# Patient Record
Sex: MALE | Race: WHITE | NOT HISPANIC OR LATINO | URBAN - METROPOLITAN AREA
[De-identification: names, ages, dates, MRNs, and addresses within clinical notes are randomized per-mention and may not be internally consistent; named-entity substitution may affect disease eponyms.]

---

## 2017-02-13 ENCOUNTER — OFFICE VISIT - HEALTHEAST (OUTPATIENT)
Dept: FAMILY MEDICINE | Facility: CLINIC | Age: 37
End: 2017-02-13

## 2017-02-13 DIAGNOSIS — J02.0 ACUTE STREPTOCOCCAL PHARYNGITIS: ICD-10-CM

## 2017-02-13 DIAGNOSIS — R07.0 THROAT PAIN: ICD-10-CM

## 2017-02-13 DIAGNOSIS — L08.9 SKIN INFECTION: ICD-10-CM

## 2017-05-09 ENCOUNTER — OFFICE VISIT - HEALTHEAST (OUTPATIENT)
Dept: FAMILY MEDICINE | Facility: CLINIC | Age: 37
End: 2017-05-09

## 2017-05-09 ENCOUNTER — RECORDS - HEALTHEAST (OUTPATIENT)
Dept: GENERAL RADIOLOGY | Facility: CLINIC | Age: 37
End: 2017-05-09

## 2017-05-09 DIAGNOSIS — M25.552 PAIN IN LEFT HIP: ICD-10-CM

## 2017-05-09 DIAGNOSIS — M25.552 LEFT HIP PAIN: ICD-10-CM

## 2017-05-09 ASSESSMENT — MIFFLIN-ST. JEOR: SCORE: 1803.06

## 2017-05-24 ENCOUNTER — COMMUNICATION - HEALTHEAST (OUTPATIENT)
Dept: FAMILY MEDICINE | Facility: CLINIC | Age: 37
End: 2017-05-24

## 2018-06-21 ENCOUNTER — OFFICE VISIT - HEALTHEAST (OUTPATIENT)
Dept: FAMILY MEDICINE | Facility: CLINIC | Age: 38
End: 2018-06-21

## 2018-06-21 ENCOUNTER — RECORDS - HEALTHEAST (OUTPATIENT)
Dept: GENERAL RADIOLOGY | Facility: CLINIC | Age: 38
End: 2018-06-21

## 2018-06-21 DIAGNOSIS — G89.29 OTHER CHRONIC PAIN: ICD-10-CM

## 2018-06-21 DIAGNOSIS — M25.562 CHRONIC PAIN OF LEFT KNEE: ICD-10-CM

## 2018-06-21 DIAGNOSIS — M25.562 PAIN IN LEFT KNEE: ICD-10-CM

## 2018-06-21 DIAGNOSIS — G89.29 CHRONIC PAIN OF LEFT KNEE: ICD-10-CM

## 2018-06-21 ASSESSMENT — MIFFLIN-ST. JEOR: SCORE: 1808.5

## 2018-06-22 ENCOUNTER — COMMUNICATION - HEALTHEAST (OUTPATIENT)
Dept: FAMILY MEDICINE | Facility: CLINIC | Age: 38
End: 2018-06-22

## 2018-06-22 DIAGNOSIS — M25.562 CHRONIC PAIN OF LEFT KNEE: ICD-10-CM

## 2018-06-22 DIAGNOSIS — G89.29 CHRONIC PAIN OF LEFT KNEE: ICD-10-CM

## 2018-07-18 ENCOUNTER — OFFICE VISIT - HEALTHEAST (OUTPATIENT)
Dept: PHYSICAL THERAPY | Facility: REHABILITATION | Age: 38
End: 2018-07-18

## 2018-07-18 DIAGNOSIS — G89.29 CHRONIC PAIN OF LEFT KNEE: ICD-10-CM

## 2018-07-18 DIAGNOSIS — M25.562 CHRONIC PAIN OF LEFT KNEE: ICD-10-CM

## 2018-07-30 ENCOUNTER — OFFICE VISIT - HEALTHEAST (OUTPATIENT)
Dept: PHYSICAL THERAPY | Facility: REHABILITATION | Age: 38
End: 2018-07-30

## 2018-07-30 DIAGNOSIS — M25.562 CHRONIC PAIN OF LEFT KNEE: ICD-10-CM

## 2018-07-30 DIAGNOSIS — G89.29 CHRONIC PAIN OF LEFT KNEE: ICD-10-CM

## 2018-08-03 ENCOUNTER — OFFICE VISIT - HEALTHEAST (OUTPATIENT)
Dept: PHYSICAL THERAPY | Facility: REHABILITATION | Age: 38
End: 2018-08-03

## 2018-08-03 DIAGNOSIS — G89.29 CHRONIC PAIN OF LEFT KNEE: ICD-10-CM

## 2018-08-03 DIAGNOSIS — M25.562 CHRONIC PAIN OF LEFT KNEE: ICD-10-CM

## 2018-08-22 ENCOUNTER — COMMUNICATION - HEALTHEAST (OUTPATIENT)
Dept: PHYSICAL THERAPY | Facility: REHABILITATION | Age: 38
End: 2018-08-22

## 2019-09-20 ENCOUNTER — OFFICE VISIT - HEALTHEAST (OUTPATIENT)
Dept: FAMILY MEDICINE | Facility: CLINIC | Age: 39
End: 2019-09-20

## 2019-09-20 ENCOUNTER — COMMUNICATION - HEALTHEAST (OUTPATIENT)
Dept: TELEHEALTH | Facility: CLINIC | Age: 39
End: 2019-09-20

## 2019-09-20 DIAGNOSIS — M25.561 CHRONIC PAIN OF RIGHT KNEE: ICD-10-CM

## 2019-09-20 DIAGNOSIS — G89.29 CHRONIC PAIN OF RIGHT KNEE: ICD-10-CM

## 2019-09-20 ASSESSMENT — MIFFLIN-ST. JEOR: SCORE: 1783.55

## 2019-09-30 ENCOUNTER — HOSPITAL ENCOUNTER (OUTPATIENT)
Dept: RADIOLOGY | Facility: HOSPITAL | Age: 39
Discharge: HOME | End: 2019-09-30
Attending: NURSE PRACTITIONER

## 2019-09-30 ENCOUNTER — HOSPITAL ENCOUNTER (OUTPATIENT)
Dept: MRI IMAGING | Facility: HOSPITAL | Age: 39
Discharge: HOME | End: 2019-09-30
Attending: NURSE PRACTITIONER

## 2019-09-30 DIAGNOSIS — M25.561 CHRONIC PAIN OF RIGHT KNEE: ICD-10-CM

## 2019-09-30 DIAGNOSIS — G89.29 CHRONIC PAIN OF RIGHT KNEE: ICD-10-CM

## 2019-10-01 ENCOUNTER — COMMUNICATION - HEALTHEAST (OUTPATIENT)
Dept: FAMILY MEDICINE | Facility: CLINIC | Age: 39
End: 2019-10-01

## 2021-05-25 ENCOUNTER — RECORDS - HEALTHEAST (OUTPATIENT)
Dept: ADMINISTRATIVE | Facility: CLINIC | Age: 41
End: 2021-05-25

## 2021-05-28 ENCOUNTER — RECORDS - HEALTHEAST (OUTPATIENT)
Dept: ADMINISTRATIVE | Facility: CLINIC | Age: 41
End: 2021-05-28

## 2021-05-30 VITALS — WEIGHT: 197.7 LBS

## 2021-05-31 VITALS — HEIGHT: 71 IN | BODY MASS INDEX: 26.57 KG/M2 | WEIGHT: 189.8 LBS

## 2021-06-01 VITALS — HEIGHT: 71 IN | BODY MASS INDEX: 26.74 KG/M2 | WEIGHT: 191 LBS

## 2021-06-01 NOTE — PROGRESS NOTES
Assessment and Plan:     1. Chronic pain of right knee  Differentials include meniscus injury, tendinitis, ligament injury, IT band syndrome, patellofemoral syndrome.  Due to positive Mike's, will obtain MRI to rule out meniscus injury.  Discussed symptomatic treatment including rest, ice, elevation, and NSAID's.  Will refer to ortho for further evaluation.  He is content with the plan.   - Ambulatory referral to Orthopedics  - MR Knee Without Contrast Right; Future    Subjective:     Christiano is a 38 y.o. male presenting to the clinic for concerns for ongoing right knee pain.  Patient has been experiencing right knee pain intermittently for multiple years.  He denies any known injury, but states the pain has worsened over the past month.  He has had difficulty walking up and down stairs. The pain is waking him up at night.  He feels as though the knee is going to give out from under him.  He descries the pain as an ache within the lateral aspect of the knee.  He has not noticed any redness, swelling, bruising.  He has been icing and resting area.  He has a history of playing hockey, football, baseball, soccer when he was younger.  He currently works in construction.  He has been taking ibuprofen.    Review of Systems: A complete 14 point review of systems was obtained and is negative or as stated in the history of present illness.    Social History     Socioeconomic History     Marital status:      Spouse name: Not on file     Number of children: Not on file     Years of education: Not on file     Highest education level: Not on file   Occupational History     Not on file   Social Needs     Financial resource strain: Not on file     Food insecurity:     Worry: Not on file     Inability: Not on file     Transportation needs:     Medical: Not on file     Non-medical: Not on file   Tobacco Use     Smoking status: Never Smoker     Smokeless tobacco: Never Used   Substance and Sexual Activity     Alcohol use:  "Yes     Comment: rare     Drug use: No     Sexual activity: Yes     Partners: Female   Lifestyle     Physical activity:     Days per week: Not on file     Minutes per session: Not on file     Stress: Not on file   Relationships     Social connections:     Talks on phone: Not on file     Gets together: Not on file     Attends Shinto service: Not on file     Active member of club or organization: Not on file     Attends meetings of clubs or organizations: Not on file     Relationship status: Not on file     Intimate partner violence:     Fear of current or ex partner: Not on file     Emotionally abused: Not on file     Physically abused: Not on file     Forced sexual activity: Not on file   Other Topics Concern     Not on file   Social History Narrative     Not on file       Active Ambulatory Problems     Diagnosis Date Noted     No Active Ambulatory Problems     Resolved Ambulatory Problems     Diagnosis Date Noted     No Resolved Ambulatory Problems     No Additional Past Medical History       Family History   Problem Relation Age of Onset     No Medical Problems Mother      No Medical Problems Father        Objective:     /64   Pulse 74   Ht 5' 11\" (1.803 m)   Wt 185 lb 8 oz (84.1 kg)   SpO2 95%   BMI 25.87 kg/m      Patient is alert, in no obvious distress.   Skin: Warm, dry.    Lungs:  Clear to auscultation. Respirations even and unlabored.  No wheezing or rales noted.   Heart:  Regular rate and rhythm.  No murmurs.  Musculoskeletal: He has full range of motion of his right knee.  Moderate crepitus is palpated upon extension.  Valgus, varus, Lachman's are all negative.  Positive Mike's lateral aspect of the knee.              "

## 2021-06-01 NOTE — TELEPHONE ENCOUNTER
----- Message from Sasha Ribera CMA sent at 10/1/2019 10:34 AM CDT -----    ----- Message -----  From: Patti Murillo CNP  Sent: 10/1/2019  10:32 AM CDT  To: Patti Murillo Care Team Pool    Please notify the patient that there a small tear within the cartilage in the inside of his knee.  He has some mild tendonitis. I recommend he see ortho as we discussed.  Thanks.

## 2021-06-03 VITALS
HEIGHT: 71 IN | WEIGHT: 185.5 LBS | HEART RATE: 74 BPM | BODY MASS INDEX: 25.97 KG/M2 | SYSTOLIC BLOOD PRESSURE: 112 MMHG | OXYGEN SATURATION: 95 % | DIASTOLIC BLOOD PRESSURE: 64 MMHG

## 2021-06-08 NOTE — PROGRESS NOTES
Chief Complaint   Patient presents with     Headache     x 3 days     Sore Throat     intermitten     Mass     inside of right thigh x 5-6 days, getting bigger       HPI:    Patient is here for:    #1. Skin infection - left thigh x 6 days, mild pain, with swelling, pus drainage, getting worse. It started out as an ingrown hair and worsened from there. No hx of injury, no hx of diabetes.     #2. Sore throat - x 3 days, moderate, with moderate headache. No home therapy so far. No cough, fever, nasal symptoms.     ROS: Pertinent ROS noted in HPI.     No Known Allergies    There is no problem list on file for this patient.    No family history on file.    Social History     Social History     Marital status:      Spouse name: N/A     Number of children: N/A     Years of education: N/A     Occupational History     Not on file.     Social History Main Topics     Smoking status: Never Smoker     Smokeless tobacco: Not on file     Alcohol use Not on file     Drug use: Not on file     Sexual activity: Not on file     Other Topics Concern     Not on file     Social History Narrative     No narrative on file           Objective:    Vitals:    02/13/17 1531   BP: 120/60   Pulse: 60   Temp: 98.5  F (36.9  C)   SpO2: 96%       Gen:NAD  Throat: normal  Ears: normal TMs ear canals  Eyes: normal conjunctiva bilaterally, PERRLA, EOMI bilaterally  Neck: no adenopathy  CV: RRR, no M, R, G  Pulm: CTAB, normal effort  Skin:  1 cm open sore with mild surounding erythema, without fluctuance nor active drainage at left proximal posteromedial thigh.    Recent Results (from the past 24 hour(s))   Rapid Strep A Screen-Throat   Result Value Ref Range    Rapid Strep A Antigen Group A Strep detected (!) No Group A Strep detected         Acute streptococcal pharyngitis  -     cephalexin (KEFLEX) 500 MG capsule; Take 1 capsule (500 mg total) by mouth 4 (four) times a day for 10 days.    Throat pain  -     Rapid Strep A Screen-Throat    Skin  infection - no evidence of active abscess. Bacitracin with dressing applied. Follow up plan discussed.   -     cephalexin (KEFLEX) 500 MG capsule; Take 1 capsule (500 mg total) by mouth 4 (four) times a day for 10 days.  -     Culture, Wound

## 2021-06-10 NOTE — PROGRESS NOTES
Assessment and Plan:     1. Left hip pain  XR Hip Left 2 or More VWS    Ambulatory referral to Orthopedics     Differentials include trochanteric bursitis and possible torn labrum.  Discussed symptomatic treatment. He declines pain medication.  Recommend avoiding kneeling, squatting, and stair climbing.  Will refer to orthopedics for further evaluation.  He is content with the plan.    Subjective:     Christiano is a 36 y.o. male presenting to the clinic for concerns for left hip pain for 3 months.  Patient feels as though his hip is unstable especially when he twists his left knee.  He has difficulty abducting the hip and knee.  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  Patient states the hip is not bothersome when he walks.  He denies any injury.  He has not been taking any pain medication.  He denies history of trauma.  He has not had any numbness and tingling of his extremity.  He has had back pain since since high school but does not feel as though it is related.    Review of Systems: A complete 14 point review of systems was obtained and is negative or as stated in the history of present illness.    Social History     Social History     Marital status:      Spouse name: N/A     Number of children: N/A     Years of education: N/A     Occupational History     Not on file.     Social History Main Topics     Smoking status: Never Smoker     Smokeless tobacco: Not on file     Alcohol use Not on file     Drug use: Not on file     Sexual activity: Not on file     Other Topics Concern     Not on file     Social History Narrative     No narrative on file       Active Ambulatory Problems     Diagnosis Date Noted     No Active Ambulatory Problems     Resolved Ambulatory Problems     Diagnosis Date Noted     No Resolved Ambulatory Problems     No Additional Past Medical History       No family history on file.    Objective:     /62 (Patient Site: Right Arm, Patient Position: Sitting,  "Cuff Size: Adult Large)  Pulse 61  Ht 5' 11\" (1.803 m)  Wt 189 lb 12.8 oz (86.1 kg)  SpO2 96%  BMI 26.47 kg/m2    Patient is alert, in no obvious distress.   Skin: Warm, dry.    Lungs:  Clear to auscultation. Respirations even and unlabored.  No wheezing or rales noted.   Heart:  Regular rate and rhythm.  No murmurs, S3, S4, gallops, or rubs.    Musculoskeletal: He has full range of motion of the left hip.  He has no difficulty squatting.  He is nontender to palpation of the hip including the bursa.  He is nontender to palpation of his lumbar musculature.  He is able to heel and toe walk without difficulty.  Patellar and Achilles reflexes +2.    LABORATORY: I ordered and personally reviewed a left hip x-ray showing no obvious fracture.  Will have radiology review.                "

## 2021-06-18 NOTE — PROGRESS NOTES
Assessment/Plan:     Patient has acute on chronic bilateral knee pain with the left being significantly worse than the right causing a limp.  Although the physical exam was fairly benign, the pain could be reproduced with certain maneuvers and the limp was obvious with ambulation.  When discussing all of the options for treatment, patient and I use shared decision-making to obtain an x-ray today to look for degenerative changes in the knee or chondromalacia.  We discussed rest and ibuprofen, physical therapy, or additional imaging.  The next course of action will be determined by the x-ray.    Problem List Items Addressed This Visit     None      Visit Diagnoses     Chronic pain of left knee    -  Primary    Relevant Orders    XR Knee Left Plus Sunrise VW (Completed)          Return to clinic PRN.    Total time spent with patient was 15 minutes with greater than 50% spent in face-to-face counseling regarding the above plan.    Subjective:      Christiano Velasquez is a 37 y.o. male who presents to clinic for left knee pain.    Patient has chronic bilateral knee and hip pain.  He has had a recent flare in bilateral knees in the past 4 weeks.  It is not significantly worsening but is also not improving.  The left knee is worse than the right knee.  He endorses walking on concrete all day secondary to his job.  Although his hips pop, both of his knees pop.  He experiences crepitus in the mornings especially when he extends his knee.  He finds the pain is worse with walking.  The pain is located in the inferior medial area but extends to the lateral superior area behind the patella.  The pain is described as sharp with a severity of 3 out of 10 at its worst and right now 1 out of 10.  It is so bad the patient has to limp.  Patient finds that external rotation of the hip and knee are worse than internal rotation but both are painful, extension and flexion are on changed.  He denies locking or instability of the knee.  He  "has never had any trauma to the knee.    Past Medical History, Family History, and Social History reviewed.     No current outpatient prescriptions on file prior to visit.     No current facility-administered medications on file prior to visit.        Review of systems is as stated in HPI.  Endorses: headaches, joint pain, arthritis, back pain, and joint swelling  The remainder of the 10 system review is otherwise negative.    Objective:     /70  Pulse 64  Ht 5' 11\" (1.803 m)  Wt 191 lb (86.6 kg)  SpO2 97%  BMI 26.64 kg/m2 Body mass index is 26.64 kg/(m^2).    Gen: Alert, NAD, appears stated age, normal hygiene   MSK: grossly full range of motion in all joints, no obvious deformity; negative thessaly test, preserved strength in the knee, no significant tenderness to palpation, obvious limp with walking      This note has been dictated using voice recognition software. Any grammatical or context distortions are unintentional and inherent to the the software.     Eleanor Sawyer MD      "

## 2021-06-19 NOTE — PROGRESS NOTES
"[facility] Daily Progress     Patient Name: Christiano Velasquez  Date: 8/3/2018  Visit #: 3  PTA visit #:  1  Referral Diagnosis: knee pain  Referring provider: Dr. Sawyer  Visit Diagnosis:     ICD-10-CM    1. Chronic pain of left knee M25.562     G89.29          Assessment:   15' late to appt  Patient tolerated MT well today w/o increase in pain. He doesn't feel that he's made any progress thus far in therapy. Icing helps temporarily     Patient is benefitting from skilled physical therapy and is making steady progress toward functional goals.  Patient is appropriate to continue with skilled physical therapy intervention, as indicated by initial plan of care.    Goal Status:  Pt. will demonstrate/verbalize independence in self-management of condition in : 12 weeks  Pt. will be independent with home exercise program in : 6 weeks  Pt will: have pain-free L knee AROM w/i 8 weeks in order to improve QOL.  Pt will: have pain <50% of his day in his left knee within 8 weeks in order to improve his QOL.    Plan / Patient Education:     Continue with initial plan of care.  Progress with home program as tolerated.     Exercises:  Exercise #1: LAQ  Comment #1: x15-20  Exercise #2: SLR  Comment #2: x15-20  Exercise #3: clamshell- increased pain. terminated  Exercise #4: prone hip ext  Comment #4: x15-20  Exercise #5: supine hooklying hip rotation  Comment #5: L3 band x10  Exercise #6: standing hip pulses  Comment #6: x15  Exercise #7: bridge  Comment #7: 5\"x 10    Plan for next session: see if patient is any better. If not, refer for MRI. Do MT again if tolerated well.     Subjective:     Pain Ratin     About the same. Was a little sore after last session. Pain was waking him up after first PT visit. Exercises didn't make him worse      Objective:     L knee ROM: 0-142 degrees   No increase in pain w/ MT today    Treatment Today     TREATMENT MINUTES COMMENTS   Evaluation     Self-care/ Home management   "   Manual therapy 8 In seated position: Gr II distraction and circles.    Neuromuscular Re-education     Therapeutic Activity     Therapeutic Exercises     Gait training     Modality__________________                Total 8    Blank areas are intentional and mean the treatment did not include these items.       Angélica Mullins, PT, DPT  8/3/2018

## 2021-06-19 NOTE — PROGRESS NOTES
[facility]   Knee Initial Evaluation    Patient Name: Christiano Velasquez  Date of evaluation: 7/18/2018  Referral Diagnosis: chronic pain of L knee  Referring provider: Eleanor Sawyer MD  Visit Diagnosis:     ICD-10-CM    1. Chronic pain of left knee M25.562     G89.29        Assessment:        Christiano Velasquez is a 37 y.o. male who presents to therapy today with chief complaints of chronic left knee pain. Symptoms began in 2016 without injury.  Patient has a history of low back pain. His pain is constant and affects all of his daily activities. Pain symptoms are getting worse.  2/3 of his meniscus tests were positive today but all other tests were negative. His ROM is within normal limits but is painful end-range.  PT POC and goals have been discussed with patient and He  is agreeable to these. Patient appears motivated for physical therapy and is appropriate for skilled therapy services.    The POC is dynamic and will be modified on an ongoing basis.  Barriers to achieving goals as noted in the assessment section may affect outcome.  Prognosis to achieve goals is  fair   Pt. is appropriate for skilled PT intervention as outlined in the Plan of Care (POC).  Pt. is a good candidate for skilled PT services to improve pain levels and function.      Goals:  Pt. will demonstrate/verbalize independence in self-management of condition in : 12 weeks  Pt. will be independent with home exercise program in : 6 weeks  Pt will: have pain-free L knee AROM w/i 8 weeks in order to improve QOL.  Pt will: have pain <50% of his day in his left knee within 8 weeks in order to improve his QOL.    Goals and plan of care were set in collaboration with the patient.    Patient's expectations/goals are realistic.    Barriers to Learning or Achieving Goals:  Chronicity of the problem.       Plan / Patient Instructions:      Plan of Care:   Communication with: Referral Source  Patient Related Instruction: Nature of  Condition;Treatment plan and rationale;Self Care instruction;Basis of treatment;Body mechanics;Posture;Precautions;Next steps;Expected outcome  Times per Week: 1-2  Number of Weeks: 10-12  Number of Visits: 12  Therapeutic Exercise: ROM;Stretching;Strengthening  Neuromuscular Reeducation: posture;balance/proprioception;core  Manual Therapy: joint mobilization;muscle energy;myofascial release;soft tissue mobilization  Equipment: theraband    Exercises:  Exercise #1: LAQ  Comment #1: x15-20  Exercise #2: SLR  Comment #2: x15-20  Exercise #3: clamshell- increased pain. terminated  Exercise #4: prone hip ext  Comment #4: x15-20    Treatment techniques, plan of care, and goals were discussed with the patient. The patient agrees to the plan as outlined. The plan of care is dynamic and will be modified on an ongoing basis.    Plan for next visit: give LEFS, JM to knee, bridging if tolerated, see how LAQ is going, s/l hip abd     Subjective:        Social information:   Occupation:construction   Work Status:Working full time      History of Present Illness:    Christiano is a 37 y.o. male who presents to therapy today with complaints of chronic left knee pain. Date of onset/duration of symptoms is , but the past month and half it's bothered him more to the point of him. No injuries.  Hasn't tried any treatments for his knee. Pain increased by bending knee and crossing his leg over the R. Pain is superior patella and inferior patella. Symptoms are constant and getting worse. Hasn't tried ice. Gets quite a bit of hip and knee crepitus. Had a back injury in . Was treated w/ chiropractic treatments, which he still sometimes goes to.     Pain Ratin  Pain rating at best: 1  Pain rating at worst: 4      Functional limitations are described as occurring with: all daily activities. Constant pain. Bending knee increases pain      Patient reports benefit from:  extending his knee       Objective:      Note: Items left blank  indicates the item was not performed or not indicated at the time of the evaluation.      Knee Examination  1. Chronic pain of left knee       Involved Side: Left  Gait Observation: antalgic on L LE  Hip Clearing: L hip PROM- WFL, pain-free in hip. Some pain in knee with L hip end-range ER and IR    B slump: neg B    Sensation B LE's: intact per subjective    Knee ROM         AROM in degrees  Right   Left  Right   Left  Right   Left       Knee Flexion  (130 )   WFL ,pain-free   140, pain from 115-end range                   Knee Extension  (0 )                       PROM in degrees  Right   Left  Right   Left  Right   Left       Knee Flexion  (130 )                         Knee Extension  (0 )                       LE Strength          5/5 on R            Strength (MMT/5)  Right   Left  Right   Left  Right   Left       Hip Flexion      5                   Hip Abduction      5                   Hip Adduction      Increase in pain, 4+                   Hip Extension                         Hip Internal Rotation                         Hip External Rotation                         Knee Extension      Increase in pain, 4+                   Knee Flexion      4+                   Ankle Dorsiflexion                         Ankle Plantarflexion                       Palpation:  Tender L inferior patella, tender inferior and lateral L patella    Knee Special Tests (+/-):      Knee OA Cluster   Right   Left   Ligament Tests   Right   Left    1. > 51 y/o      neg     Lachman      neg    2. Stiffness > 30 min.           Anterior Drawer      neg    3. Crepitus      +     Posterior Drawer      neg    4. Bony tenderness      neg     Posterior Sag      neg    5. Bone enlargement      neg     Valgus Stress      neg    6. No warmth to the touch      neg     Varus Stress      neg     Meniscal Tests   Right   Left    Other   Right    Left       Mike's      +     Ely's             Joint line tenderness      neg     Josué              Thessaly      +     Moy's      neg       Apley's      neg     compression      neg         Treatment Today     TREATMENT MINUTES COMMENTS   Evaluation 15 -knee pain   Self-care/ Home management     Manual therapy     Neuromuscular Re-education     Therapeutic Activity     Therapeutic Exercises 25 Discussed PT POC and pathology of condition. Answered patient questions. Began HEP-see flowsheet. Recommend patient heat/ice as needed.   Gait training     Modality__________________                Total 40    Blank areas are intentional and mean the treatment did not include these items.     PT Evaluation Code: (Please list factors)  Patient History/Comorbidities: low back pain  Examination: chronic pain of left knee  Clinical Presentation: stable  Clinical Decision Making: low    Patient History/  Comorbidities Examination  (body structures and functions, activity limitations, and/or participation restrictions) Clinical Presentation Clinical Decision Making (Complexity)   No documented Comorbidities or personal factors 1-2 Elements Stable and/or uncomplicated Low   1-2 documented comorbidities or personal factor 3 Elements Evolving clinical presentation with changing characteristics Moderate   3-4 documented comorbidities or personal factors 4 or more Unstable and unpredictable High Angélica Mullins, PT, DPT  7/18/2018  2:38 PM

## 2021-06-19 NOTE — PROGRESS NOTES
"[facility] Daily Progress     Patient Name: Christiano Velasquez  Date: 2018  Visit #: 2  PTA visit #:  1  Referral Diagnosis: [unfilled]  Referring provider: Eleanor Sawyer MD  Visit Diagnosis:     ICD-10-CM    1. Chronic pain of left knee M25.562     G89.29          Assessment:   Pt returns today for his first follow up appointment.   Pt has been compliant with his HEP.  LEFS:80/80  Patient is benefitting from skilled physical therapy and is making steady progress toward functional goals.  Patient is appropriate to continue with skilled physical therapy intervention, as indicated by initial plan of care.    Goal Status:  Pt. will demonstrate/verbalize independence in self-management of condition in : 12 weeks  Pt. will be independent with home exercise program in : 6 weeks  Pt will: have pain-free L knee AROM w/i 8 weeks in order to improve QOL.  Pt will: have pain <50% of his day in his left knee within 8 weeks in order to improve his QOL.    Plan / Patient Education:     Continue with initial plan of care.  Progress with home program as tolerated.   Go over HEP, consider Kinesiotape.    Subjective:   Pt states his knee was sore after the initial visit.   Pt states the pain is constant.   Pt reports his knee \"pops and grinds\" in the morning when getting up in the morning.  Pain Ratin        Objective:     L knee ROM: 0-142 degrees     Exercises:  Exercise #1: LAQ  Comment #1: x15-20  Exercise #2: SLR  Comment #2: x15-20  Exercise #3: clamshell- increased pain. terminated  Exercise #4: prone hip ext  Comment #4: x15-20  Exercise #5: supine hooklying hip rotation  Comment #5: L3 band x10  Exercise #6: standing hip pulses  Comment #6: x15  Exercise #7: bridge  Comment #7: 5\"x 10     Treatment Today     TREATMENT MINUTES COMMENTS   Evaluation     Self-care/ Home management     Manual therapy     Neuromuscular Re-education     Therapeutic Activity     Therapeutic Exercises 25 See flow " "sheet   Added to HEP:  -bridge   -supine hooklying L hip rotation with L3 band   -standing hip AB \"pulses\" (had hip and LBP with SL hip AB)   Gait training     Modality__________________                Total 25    Blank areas are intentional and mean the treatment did not include these items.       Susannah Hooper,CLT  7/30/2018      "

## 2021-06-20 NOTE — PROGRESS NOTES
[facility] Discharge Summary  Patient Name: Christiano Velasquez  Date: 10/11/2018  Referral Diagnosis: left knee pain  Referring provider: No ref. provider found  Visit Diagnosis:   1. Chronic pain of left knee         Goals (NOT MET):  Pt. will demonstrate/verbalize independence in self-management of condition in : 12 weeks  Pt. will be independent with home exercise program in : 6 weeks  Pt will: have pain-free L knee AROM w/i 8 weeks in order to improve QOL.  Pt will: have pain <50% of his day in his left knee within 8 weeks in order to improve his QOL.    Patient was seen for 3 visits with 2 missed appointments.  The patient attended therapy initially, but did not finish the therapy sessions prescribed.  Goals were not fully achieved. Explanation for goals not achieved: Patient did not return after his 3rd PT visit  The patient was issued a HEP and instructed in proper usage.    Therapy will be discontinued at this time.  The patient will need a new referral to resume.    Thank you for your referral.  Angélica Bragg, PT, DPT  10/11/2018  9:01 AM